# Patient Record
Sex: FEMALE | Race: BLACK OR AFRICAN AMERICAN | NOT HISPANIC OR LATINO | Employment: OTHER | URBAN - METROPOLITAN AREA
[De-identification: names, ages, dates, MRNs, and addresses within clinical notes are randomized per-mention and may not be internally consistent; named-entity substitution may affect disease eponyms.]

---

## 2023-02-07 PROBLEM — Z01.419 WELL WOMAN EXAM: Status: ACTIVE | Noted: 2023-02-07

## 2023-02-07 PROBLEM — N92.0 HEAVY MENSES: Status: ACTIVE | Noted: 2023-02-07

## 2023-04-06 RX ORDER — TRAZODONE HYDROCHLORIDE 100 MG/1
100 TABLET ORAL NIGHTLY
COMMUNITY
Start: 2023-01-17

## 2023-04-06 RX ORDER — AMLODIPINE BESYLATE 5 MG/1
5 TABLET ORAL DAILY
COMMUNITY
Start: 2023-01-17

## 2023-04-06 RX ORDER — ERGOCALCIFEROL 1.25 MG/1
1 CAPSULE ORAL
COMMUNITY
Start: 2023-01-28

## 2023-04-06 RX ORDER — LINAGLIPTIN 5 MG/1
5 TABLET, FILM COATED ORAL DAILY
COMMUNITY
Start: 2023-02-03

## 2023-04-06 RX ORDER — LISINOPRIL 10 MG/1
10 TABLET ORAL DAILY
COMMUNITY
Start: 2023-01-17

## 2023-04-06 RX ORDER — INSULIN GLARGINE 100 [IU]/ML
17 INJECTION, SOLUTION SUBCUTANEOUS NIGHTLY
COMMUNITY
Start: 2022-12-18

## 2023-04-06 RX ORDER — LEVOCETIRIZINE DIHYDROCHLORIDE 5 MG/1
5 TABLET, FILM COATED ORAL NIGHTLY
COMMUNITY
Start: 2023-01-09

## 2023-04-06 RX ORDER — GLIMEPIRIDE 2 MG/1
2 TABLET ORAL 2 TIMES DAILY WITH MEALS
COMMUNITY
Start: 2023-01-23

## 2023-04-06 RX ORDER — METFORMIN HYDROCHLORIDE 500 MG/1
750 TABLET, EXTENDED RELEASE ORAL 2 TIMES DAILY
COMMUNITY
Start: 2023-02-03

## 2023-04-06 RX ORDER — PANTOPRAZOLE SODIUM 40 MG/1
40 TABLET, DELAYED RELEASE ORAL DAILY
COMMUNITY
Start: 2023-02-06

## 2023-04-11 ENCOUNTER — LAB VISIT (OUTPATIENT)
Dept: LAB | Facility: HOSPITAL | Age: 38
End: 2023-04-11
Attending: STUDENT IN AN ORGANIZED HEALTH CARE EDUCATION/TRAINING PROGRAM
Payer: MEDICARE

## 2023-04-11 DIAGNOSIS — Z01.419 ROUTINE GYNECOLOGICAL EXAMINATION: ICD-10-CM

## 2023-04-11 DIAGNOSIS — N92.0 EXCESSIVE OR FREQUENT MENSTRUATION: Primary | ICD-10-CM

## 2023-04-11 DIAGNOSIS — N92.0 EXCESSIVE OR FREQUENT MENSTRUATION: ICD-10-CM

## 2023-04-11 LAB
ANION GAP SERPL CALC-SCNC: 11 MEQ/L
BASOPHILS # BLD AUTO: 0.17 X10(3)/MCL (ref 0–0.2)
BASOPHILS NFR BLD AUTO: 1 %
BUN SERPL-MCNC: 12.1 MG/DL (ref 7–18.7)
CALCIUM SERPL-MCNC: 9.6 MG/DL (ref 8.4–10.2)
CHLORIDE SERPL-SCNC: 105 MMOL/L (ref 98–107)
CO2 SERPL-SCNC: 20 MMOL/L (ref 22–29)
CREAT SERPL-MCNC: 0.73 MG/DL (ref 0.55–1.02)
CREAT/UREA NIT SERPL: 17
EOSINOPHIL # BLD AUTO: 0.28 X10(3)/MCL (ref 0–0.9)
EOSINOPHIL NFR BLD AUTO: 1.7 %
ERYTHROCYTE [DISTWIDTH] IN BLOOD BY AUTOMATED COUNT: 16.3 % (ref 11.5–17)
GFR SERPLBLD CREATININE-BSD FMLA CKD-EPI: >60 MLS/MIN/1.73/M2
GLUCOSE SERPL-MCNC: 105 MG/DL (ref 74–100)
HCT VFR BLD AUTO: 38.4 % (ref 37–47)
HGB BLD-MCNC: 11.9 G/DL (ref 12–16)
IMM GRANULOCYTES # BLD AUTO: 0.11 X10(3)/MCL (ref 0–0.04)
IMM GRANULOCYTES NFR BLD AUTO: 0.7 %
LYMPHOCYTES # BLD AUTO: 2.79 X10(3)/MCL (ref 0.6–4.6)
LYMPHOCYTES NFR BLD AUTO: 17 %
MCH RBC QN AUTO: 23.9 PG (ref 27–31)
MCHC RBC AUTO-ENTMCNC: 31 G/DL (ref 33–36)
MCV RBC AUTO: 77.3 FL (ref 80–94)
MONOCYTES # BLD AUTO: 1.62 X10(3)/MCL (ref 0.1–1.3)
MONOCYTES NFR BLD AUTO: 9.9 %
NEUTROPHILS # BLD AUTO: 11.45 X10(3)/MCL (ref 2.1–9.2)
NEUTROPHILS NFR BLD AUTO: 69.7 %
NRBC BLD AUTO-RTO: 0 %
PLATELET # BLD AUTO: 608 X10(3)/MCL (ref 130–400)
PMV BLD AUTO: 11.3 FL (ref 7.4–10.4)
POTASSIUM SERPL-SCNC: 4.8 MMOL/L (ref 3.5–5.1)
RBC # BLD AUTO: 4.97 X10(6)/MCL (ref 4.2–5.4)
SODIUM SERPL-SCNC: 136 MMOL/L (ref 136–145)
WBC # SPEC AUTO: 16.4 X10(3)/MCL (ref 4.5–11.5)

## 2023-04-11 PROCEDURE — 80048 BASIC METABOLIC PNL TOTAL CA: CPT

## 2023-04-11 PROCEDURE — 85025 COMPLETE CBC W/AUTO DIFF WBC: CPT

## 2023-04-11 PROCEDURE — 36415 COLL VENOUS BLD VENIPUNCTURE: CPT

## 2023-04-11 NOTE — H&P
Preop History & Physical    Chief Complaint: well woman     HPI:      Pushpa Perez is a 37 y.o.   who presents today for evaluation of above chief complaint.    Menses monthly, for 5 days, pain minimal     SA never     Nonverbal.     Sister, caretaker would like pap under sedation     Pt has never cooperated with exam    ROS completely negative per family    No chief complaint on file.       Patient's last menstrual period was 2023.     OB History    Para Term  AB Living   0 0 0 0 0 0   SAB IAB Ectopic Multiple Live Births   0 0 0 0 0       Past Medical History:   Diagnosis Date    Autism     nonverbal    GERD (gastroesophageal reflux disease)     HTN (hypertension)     T2DM (type 2 diabetes mellitus)        Past Surgical History:   Procedure Laterality Date    TONSILLECTOMY      and adenoids       History reviewed. No pertinent family history.    Social History     Socioeconomic History    Marital status: Single   Tobacco Use    Smoking status: Never    Smokeless tobacco: Never   Substance and Sexual Activity    Alcohol use: Never    Drug use: Never    Sexual activity: Never       No current facility-administered medications for this encounter.     Current Outpatient Medications   Medication Sig Dispense Refill    amLODIPine (NORVASC) 5 MG tablet Take 5 mg by mouth Daily.      glimepiride (AMARYL) 2 MG tablet Take 2 mg by mouth 2 (two) times daily with meals.      LANTUS SOLOSTAR U-100 INSULIN glargine 100 units/mL SubQ pen Inject 17 Units into the skin nightly.      levocetirizine (XYZAL) 5 MG tablet Take 5 mg by mouth nightly.      lisinopriL 10 MG tablet Take 10 mg by mouth Daily.      metFORMIN (GLUCOPHAGE-XR) 500 MG ER 24hr tablet Take 750 mg by mouth 2 (two) times a day.      norethindrone (AYGESTIN) 5 mg Tab Take 1 tablet (5 mg total) by mouth once daily. Cycle day 14 through 28 30 tablet 11    pantoprazole (PROTONIX) 40 MG tablet Take 40 mg by mouth Daily.      TRADJENTA 5  "mg Tab tablet Take 5 mg by mouth Daily.      traZODone (DESYREL) 100 MG tablet Take 100 mg by mouth every evening.      VITAMIN D2 1,250 mcg (50,000 unit) capsule Take 1 capsule by mouth every 7 days.         Review of patient's allergies indicates:  No Known Allergies      Physical Exam:      Ht 5' 5" (1.651 m)   Wt 98.4 kg (217 lb)   LMP 03/21/2023   Breastfeeding No   BMI 36.11 kg/m²   Body mass index is 36.11 kg/m².     APPEARANCE: Well nourished, well developed, in no acute distress.  PSYCH: nonverbal  CHEST: Normal respiratory effort,  CV: Normal capillary refill.  ABDOMEN: Soft.  No tenderness or masses.    PELVIC:  deferred  EXTREMITIES: No edema       Results:         Assessment/Plan:     Active Problem List with Overview Notes    Diagnosis Date Noted    Well woman exam 02/07/2023     Discussed that 5% of cercival cancers are non sexually related. Sister desire pap smear  Plan for annual exam with pap under sedation.  Dentist unavailble, would like me to take a look at teeth, understand I am not dentist and may miss problems        Heavy menses 02/07/2023     Norethindrone 5 cycle day 14-28 refilled          pap, breast exam, tooth exam under sedation due to severe MR.    Robbie Ledezma MD  04/11/2023 11:09 AM                "

## 2023-04-12 ENCOUNTER — HOSPITAL ENCOUNTER (OUTPATIENT)
Facility: HOSPITAL | Age: 38
Discharge: HOME | End: 2023-04-12
Attending: STUDENT IN AN ORGANIZED HEALTH CARE EDUCATION/TRAINING PROGRAM | Admitting: STUDENT IN AN ORGANIZED HEALTH CARE EDUCATION/TRAINING PROGRAM
Payer: MEDICARE

## 2023-04-12 ENCOUNTER — ANESTHESIA (OUTPATIENT)
Dept: SURGERY | Facility: HOSPITAL | Age: 38
End: 2023-04-12
Payer: MEDICARE

## 2023-04-12 ENCOUNTER — ANESTHESIA EVENT (OUTPATIENT)
Dept: SURGERY | Facility: HOSPITAL | Age: 38
End: 2023-04-12
Payer: MEDICARE

## 2023-04-12 VITALS
WEIGHT: 217 LBS | RESPIRATION RATE: 17 BRPM | SYSTOLIC BLOOD PRESSURE: 124 MMHG | OXYGEN SATURATION: 100 % | TEMPERATURE: 99 F | BODY MASS INDEX: 36.15 KG/M2 | HEIGHT: 65 IN | DIASTOLIC BLOOD PRESSURE: 67 MMHG | HEART RATE: 89 BPM

## 2023-04-12 DIAGNOSIS — N94.6 DYSMENORRHEA: ICD-10-CM

## 2023-04-12 DIAGNOSIS — Z01.419 WELL WOMAN EXAM: Primary | ICD-10-CM

## 2023-04-12 LAB
B-HCG UR QL: NEGATIVE
CTP QC/QA: YES
POCT GLUCOSE: 84 MG/DL (ref 70–110)

## 2023-04-12 PROCEDURE — 81025 URINE PREGNANCY TEST: CPT | Performed by: STUDENT IN AN ORGANIZED HEALTH CARE EDUCATION/TRAINING PROGRAM

## 2023-04-12 PROCEDURE — D9220A PRA ANESTHESIA: ICD-10-PCS | Mod: ANES,,, | Performed by: STUDENT IN AN ORGANIZED HEALTH CARE EDUCATION/TRAINING PROGRAM

## 2023-04-12 PROCEDURE — D9220A PRA ANESTHESIA: Mod: ANES,,, | Performed by: STUDENT IN AN ORGANIZED HEALTH CARE EDUCATION/TRAINING PROGRAM

## 2023-04-12 PROCEDURE — 36000704 HC OR TIME LEV I 1ST 15 MIN: Performed by: STUDENT IN AN ORGANIZED HEALTH CARE EDUCATION/TRAINING PROGRAM

## 2023-04-12 PROCEDURE — D9220A PRA ANESTHESIA: Mod: CRNA,,, | Performed by: NURSE ANESTHETIST, CERTIFIED REGISTERED

## 2023-04-12 PROCEDURE — 71000015 HC POSTOP RECOV 1ST HR: Performed by: STUDENT IN AN ORGANIZED HEALTH CARE EDUCATION/TRAINING PROGRAM

## 2023-04-12 PROCEDURE — 63600175 PHARM REV CODE 636 W HCPCS

## 2023-04-12 PROCEDURE — 37000008 HC ANESTHESIA 1ST 15 MINUTES: Performed by: STUDENT IN AN ORGANIZED HEALTH CARE EDUCATION/TRAINING PROGRAM

## 2023-04-12 PROCEDURE — D9220A PRA ANESTHESIA: ICD-10-PCS | Mod: CRNA,,, | Performed by: NURSE ANESTHETIST, CERTIFIED REGISTERED

## 2023-04-12 PROCEDURE — 82962 GLUCOSE BLOOD TEST: CPT | Performed by: STUDENT IN AN ORGANIZED HEALTH CARE EDUCATION/TRAINING PROGRAM

## 2023-04-12 PROCEDURE — 63600175 PHARM REV CODE 636 W HCPCS: Performed by: STUDENT IN AN ORGANIZED HEALTH CARE EDUCATION/TRAINING PROGRAM

## 2023-04-12 PROCEDURE — 63600175 PHARM REV CODE 636 W HCPCS: Performed by: NURSE ANESTHETIST, CERTIFIED REGISTERED

## 2023-04-12 PROCEDURE — 25000003 PHARM REV CODE 250: Performed by: NURSE ANESTHETIST, CERTIFIED REGISTERED

## 2023-04-12 RX ORDER — MIDAZOLAM HYDROCHLORIDE 1 MG/ML
1 INJECTION INTRAMUSCULAR; INTRAVENOUS ONCE
Status: COMPLETED | OUTPATIENT
Start: 2023-04-12 | End: 2023-04-12

## 2023-04-12 RX ORDER — LIDOCAINE HYDROCHLORIDE 10 MG/ML
INJECTION, SOLUTION EPIDURAL; INFILTRATION; INTRACAUDAL; PERINEURAL
Status: DISCONTINUED | OUTPATIENT
Start: 2023-04-12 | End: 2023-04-12

## 2023-04-12 RX ORDER — SODIUM CHLORIDE, SODIUM LACTATE, POTASSIUM CHLORIDE, CALCIUM CHLORIDE 600; 310; 30; 20 MG/100ML; MG/100ML; MG/100ML; MG/100ML
INJECTION, SOLUTION INTRAVENOUS CONTINUOUS
Status: DISCONTINUED | OUTPATIENT
Start: 2023-04-12 | End: 2023-04-12 | Stop reason: HOSPADM

## 2023-04-12 RX ORDER — MIDAZOLAM HYDROCHLORIDE 1 MG/ML
INJECTION INTRAMUSCULAR; INTRAVENOUS
Status: COMPLETED
Start: 2023-04-12 | End: 2023-04-12

## 2023-04-12 RX ORDER — PROPOFOL 10 MG/ML
VIAL (ML) INTRAVENOUS
Status: DISCONTINUED | OUTPATIENT
Start: 2023-04-12 | End: 2023-04-12

## 2023-04-12 RX ADMIN — LIDOCAINE HYDROCHLORIDE 50 MG: 10 INJECTION, SOLUTION EPIDURAL; INFILTRATION; INTRACAUDAL; PERINEURAL at 09:04

## 2023-04-12 RX ADMIN — MIDAZOLAM HYDROCHLORIDE 1 MG: 1 INJECTION, SOLUTION INTRAMUSCULAR; INTRAVENOUS at 09:04

## 2023-04-12 RX ADMIN — SODIUM CHLORIDE, POTASSIUM CHLORIDE, SODIUM LACTATE AND CALCIUM CHLORIDE: 600; 310; 30; 20 INJECTION, SOLUTION INTRAVENOUS at 08:04

## 2023-04-12 RX ADMIN — PROPOFOL 50 MG: 10 INJECTION, EMULSION INTRAVENOUS at 09:04

## 2023-04-12 RX ADMIN — MIDAZOLAM HYDROCHLORIDE 1 MG: 1 INJECTION INTRAMUSCULAR; INTRAVENOUS at 09:04

## 2023-04-12 RX ADMIN — PROPOFOL 30 MG: 10 INJECTION, EMULSION INTRAVENOUS at 09:04

## 2023-04-12 NOTE — ANESTHESIA PREPROCEDURE EVALUATION
04/12/2023  Pushpa Perez is a 37 y.o., female.    Other Medical History   T2DM (type 2 diabetes mellitus) HTN (hypertension)   Autism GERD (gastroesophageal reflux disease)     Surgical History  TONSILLECTOMY     Hx of autism, non-verbal at baseline, follows some basic commands, responds to her name.  She requires anesthesia for pap smear today.  Appropriately NPO.  Labs reviewed, nl.      Pre-op Assessment    I have reviewed the Patient Summary Reports.     I have reviewed the Nursing Notes. I have reviewed the NPO Status.   I have reviewed the Medications.     Review of Systems  Anesthesia Hx:   Denies Personal Hx of Anesthesia complications.   Cardiovascular:   Hypertension    Pulmonary:  Pulmonary Normal    Hepatic/GI:   GERD    Neurological:  Neurology Normal    Endocrine:   Diabetes, using insulin  Obesity / BMI > 30  Psych:   Psychiatric History (autism, non-verbal)          Physical Exam  General: Well nourished, Cooperative and Alert    Airway:  Mallampati: II   Mouth Opening: Normal  TM Distance: Normal  Tongue: Normal    Dental:  Intact    Heart:  Rhythm: Regular Rhythm        Anesthesia Plan  Type of Anesthesia, risks & benefits discussed:    Anesthesia Type: Gen Natural Airway  Intra-op Monitoring Plan: Standard ASA Monitors  Post Op Pain Control Plan: IV/PO Opioids PRN  Induction:  IV  Informed Consent: Informed consent signed with the Patient representative and all parties understand the risks and agree with anesthesia plan.  All questions answered.   ASA Score: 3  Day of Surgery Review of History & Physical: H&P Update referred to the surgeon/provider.    Ready For Surgery From Anesthesia Perspective.     .

## 2023-04-12 NOTE — OP NOTE
[facility] - Periop Services  OBGYN  Operative Note    SUMMARY     Date of Procedure: 04/12/2023    Procedure:   Procedure(s) (LRB):  EXAM UNDER ANESTHESIA, VAGINA  Pap with sedation  per Mariaelena he will bring pap kit with him (N/A)       Surgeon(s) and Role:     * Robbie Ledezma MD - Primary    Pre-Operative Diagnosis: inability to tolerate well woman exam due to severe MR    Post-Operative Diagnosis:   same    Anesthesia: General    Technical Procedures Used: Oral exam, breast exam, pelvic exam with pap smear.     Description of the Findings of the Procedure: The patient was taken to the operating room where a time out was performed. Anesthesia was then obtained.       ORAL: fillings present. No obvious cavities. Poor dentition with tartar on teeth.  Breasts: breasts appear normal, no suspicious masses, no skin or nipple changes or axillary nodes.  ABDOMEN: Soft.  No tenderness or masses.    PELVIC:  Normal external genitalia without lesions.  Normal hair distribution.  Adequate perineal body, normal urethral meatus.  Vagina hymenal ring partially intact.  Cervical ectropion present.  No significant cystocele or rectocele.  Bimanual exam shows uterus to be 10-12 week size, regular, mobile.  Adnexa without masses. Pap collected    Complications: No    Estimated Blood Loss (EBL): 10cc from hymenal abrasion    Specimens: pap brought back to office           Condition: Good    Disposition: PACU - hemodynamically stable.    Called family on [phone], no answer, left message to call me back    Robbie Ledezma MD  04/12/2023 10:02 AM

## 2023-04-12 NOTE — TRANSFER OF CARE
"Anesthesia Transfer of Care Note    Patient: Pushpa Perez    Procedure(s) Performed: Procedure(s) (LRB):  EXAM UNDER ANESTHESIA, VAGINA  Pap with sedation  per Mariaelena he will bring pap kit with him (N/A)    Patient location: OPS    Anesthesia Type: MAC    Transport from OR: Transported from OR on room air with adequate spontaneous ventilation    Post pain: adequate analgesia    Post assessment: no apparent anesthetic complications    Post vital signs: stable    Level of consciousness: awake    Nausea/Vomiting: no nausea/vomiting    Complications: none    Transfer of care protocol was followed      Last vitals:   Visit Vitals  /88   Pulse 96   Temp 36.9 °C (98.5 °F) (Oral)   Resp 16   Ht 5' 5" (1.651 m)   Wt 98.4 kg (217 lb)   LMP 03/21/2023   SpO2 97%   Breastfeeding No   BMI 36.11 kg/m²     "

## 2023-04-12 NOTE — ANESTHESIA POSTPROCEDURE EVALUATION
Anesthesia Post Evaluation    Patient: Pushpa Perez    Procedure(s) Performed: Procedure(s) (LRB):  EXAM UNDER ANESTHESIA, VAGINA  Pap with sedation  per Mariaelena he will bring pap kit with him (N/A)    Final Anesthesia Type: MAC      Patient location during evaluation: OPS  Patient participation: Yes- Able to Participate  Level of consciousness: awake and alert  Post-procedure vital signs: reviewed and stable  Pain management: adequate  Airway patency: patent    PONV status at discharge: No PONV  Anesthetic complications: no      Cardiovascular status: blood pressure returned to baseline  Respiratory status: unassisted and room air  Hydration status: euvolemic  Follow-up not needed.          Vitals Value Taken Time    77 04/12/23 0716   Temp 36.9 °C (98.5 °F) 04/12/23 0716   Pulse 96 04/12/23 0716   Resp 16 04/12/23 0724   SpO2 97 % 04/12/23 0716         No case tracking events are documented in the log.      Pain/Meenakshi Score: No data recorded

## 2023-04-15 NOTE — DISCHARGE SUMMARY
Discharge Summary   Requested by medical records for same-day surgery      Primary Clinician: Robbie Ledezma MD    Discharge Provider: Robbie Ledezma MD    Admission date: 4/12/2023  6:52 AM    Discharge date: 4/12/2023 10:26 AM    Admit Dx:  Excessive or frequent menstruation [N92.0]  Routine gynecological examination [Z01.419]  Dysmenorrhea [N94.6]     Discharge Dx:    same    Procedure:   EXAM UNDER ANESTHESIA, VAGINA  Pap with sedation  per Mariaelena he will bring pap kit with him  [place] PELVIC EXAMINATION W ANESTH [[id number]]    Hospital Course:  Pushpa Perez is a 37 y.o. who presented for same-day surgery for above procedure for above diagnosis and was discharged if meeting criteria.    Disposition: To home, self care    Follow Up: 1 year    Robbie Ledezma MD  04/15/2023 8:33 AM

## (undated) DEVICE — SUPPORT ULNA NERVE PROTECTOR

## (undated) DEVICE — GLOVE PROTEXIS HYDROGEL SZ7